# Patient Record
Sex: MALE | NOT HISPANIC OR LATINO | URBAN - METROPOLITAN AREA
[De-identification: names, ages, dates, MRNs, and addresses within clinical notes are randomized per-mention and may not be internally consistent; named-entity substitution may affect disease eponyms.]

---

## 2020-12-22 ENCOUNTER — IMPORTED ENCOUNTER (OUTPATIENT)
Dept: URBAN - METROPOLITAN AREA CLINIC 1 | Facility: CLINIC | Age: 75
End: 2020-12-22

## 2020-12-22 PROBLEM — H26.492: Noted: 2020-12-22

## 2020-12-22 PROBLEM — Z96.1: Noted: 2020-12-22

## 2020-12-22 PROBLEM — H25.811: Noted: 2020-12-22

## 2020-12-22 PROCEDURE — 92004 COMPRE OPH EXAM NEW PT 1/>: CPT

## 2020-12-22 PROCEDURE — 92015 DETERMINE REFRACTIVE STATE: CPT

## 2020-12-22 NOTE — PATIENT DISCUSSION
1.  Cataract OD: Observe for now without intervention. The patient was advised to contact us if any change or worsening of vision2. PCO  OS: (Posterior Capsule Opacification)   Observe and consider yag cap when pt feels pco visually significant and visual acuity decreases to appropriate level. 3. Iris Atrophy OS - observe. 4.  Pseudophakia OS - done by PMG a long time ago  MRX for glasses given. Return for an appointment in 6 months 10/GITA/norberto with Dr. Luiz Thomas.

## 2021-05-18 ENCOUNTER — IMPORTED ENCOUNTER (OUTPATIENT)
Dept: URBAN - METROPOLITAN AREA CLINIC 1 | Facility: CLINIC | Age: 76
End: 2021-05-18

## 2021-05-18 PROBLEM — H18.20: Noted: 2021-05-18

## 2021-05-18 PROBLEM — H20.9: Noted: 2021-05-18

## 2021-05-18 PROCEDURE — 99213 OFFICE O/P EST LOW 20 MIN: CPT

## 2021-05-18 NOTE — PATIENT DISCUSSION
D/c Erythromycin keri secondary to possible allergic reaction. Begin Tobradex QID OS (erx'd). Begin Refresh Marcos 3 PF OS (sample given)Return for an appointment in Thursday 10 with Dr. Ayde Cowan.

## 2021-05-18 NOTE — PATIENT DISCUSSION
1.  Uveitis OS - Possibly traumatic secondary to FB removal. D/c Erythromycin keri secondary to possible allergic reaction. Begin Tobradex QID OS (erx'd). Begin Refresh Marcos 3 PF OS (sample given)2. Corneal Edema OS3. Cataract OD- Observe for now without intervention. The patient was advised to contact us if any change or worsening of vision4. PCO  OS- (Posterior Capsule Opacification) Observe and consider yag cap when pt feels pco visually significant and visual acuity decreases to appropriate level. 5. Iris Atrophy OS- Observe. 6.  Pseudophakia OS- Done by PMG a long time agoReturn for an appointment in Thursday 10 with Dr. Joni Schaumann.

## 2021-05-20 ENCOUNTER — IMPORTED ENCOUNTER (OUTPATIENT)
Dept: URBAN - METROPOLITAN AREA CLINIC 1 | Facility: CLINIC | Age: 76
End: 2021-05-20

## 2021-05-20 PROBLEM — H18.20: Noted: 2021-05-20

## 2021-05-20 PROBLEM — H20.9: Noted: 2021-05-20

## 2021-05-20 PROCEDURE — 99213 OFFICE O/P EST LOW 20 MIN: CPT

## 2021-05-20 NOTE — PATIENT DISCUSSION
1.  Uveitis OS - Possibly traumatic secondary to FB removal. Improving. Decrease Tobradex to BID OS. Cont PF ATs QID-Q2Hrs OS. Begin Durezol QID OS (2x sample given). 2. Corneal Edema OS - Improving. 3.  Cataract OD: Observe for now without intervention. The patient was advised to contact us if any change or worsening of vision4. PCO  OS: (Posterior Capsule Opacification)   Observe and consider yag cap when pt feels pco visually significant and visual acuity decreases to appropriate level. 5. Iris Atrophy OS - observe. 6.  Pseudophakia OS - done by PMG a long time agoReturn for an appointment in Monday for a 10 with Dr. Jaleel Paez.

## 2021-05-24 ENCOUNTER — IMPORTED ENCOUNTER (OUTPATIENT)
Dept: URBAN - METROPOLITAN AREA CLINIC 1 | Facility: CLINIC | Age: 76
End: 2021-05-24

## 2021-05-24 PROCEDURE — 99213 OFFICE O/P EST LOW 20 MIN: CPT

## 2021-05-24 PROCEDURE — 92134 CPTRZ OPH DX IMG PST SGM RTA: CPT

## 2021-05-24 NOTE — PATIENT DISCUSSION
1.  Decrease VA secondary to Macular Edema OS- Begin Prolensa QD OS only (sample given). Increase Durezol to Q2 hours OS only. Mac OCT done today shows Mac Edema OS2. Uveitis OS- Possibly traumatic secondary to FB removal. Improving. Cont PF ATs QID-Q2Hrs OS. Continue Durezol increase to Q2 hours OS. 3. Cataract OD- Observe for now without intervention. The patient was advised to contact us if any change or worsening of vision4. PCO OS- (Posterior Capsule Opacification) Observe and consider yag cap when pt feels pco visually significant and visual acuity decreases to appropriate level. 5. Iris Atrophy OS- Observe. 6.  Pseudophakia OS- Done by PMG a long time agoReturn for an appointment in 1 week 10/Mac OCT with Dr. Bear.

## 2021-05-26 PROBLEM — H35.81: Noted: 2021-05-26

## 2021-05-26 PROBLEM — H20.9: Noted: 2021-05-24

## 2021-06-03 ENCOUNTER — IMPORTED ENCOUNTER (OUTPATIENT)
Dept: URBAN - METROPOLITAN AREA CLINIC 1 | Facility: CLINIC | Age: 76
End: 2021-06-03

## 2021-06-03 PROBLEM — H35.352: Noted: 2021-06-03

## 2021-06-03 PROBLEM — H20.9: Noted: 2021-06-03

## 2021-06-03 PROCEDURE — 92134 CPTRZ OPH DX IMG PST SGM RTA: CPT

## 2021-06-03 PROCEDURE — 92012 INTRM OPH EXAM EST PATIENT: CPT

## 2021-06-03 NOTE — PATIENT DISCUSSION
1.  Primary Anterior Uveitis w/ CME OS - Possibly traumatic secondary to FB removal. Uveitis is improving however CME remains. Will plan Kenalog injection OS (written rx given for Kenalog 40 mg/mL). Prolensa QD OS only. Durezol to Q2 hours OS only. Mac OCT done again today shows CME OS with mild worsening CME. Cont PF ATs QID-Q2Hrs OS. 2.  Cataract OD - Observe for now without intervention. The patient was advised to contact us if any change or worsening of vision3. PCO OS - (Posterior Capsule Opacification) Observe and consider YAG cap when pt feels PCO visually significant and visual acuity decreases to appropriate level. 4. Iris Atrophy OS - Observe. 5.  Pseudophakia OS - Done by PMG a long time ago6. S/p LASIK OU (monoVA)Return for an appointment in tomorrow Kenalog injection OS with Dr. Cristina Gray.

## 2021-06-04 ENCOUNTER — IMPORTED ENCOUNTER (OUTPATIENT)
Dept: URBAN - METROPOLITAN AREA CLINIC 1 | Facility: CLINIC | Age: 76
End: 2021-06-04

## 2021-06-04 PROCEDURE — 68200 TREAT EYELID BY INJECTION: CPT

## 2021-06-04 NOTE — PATIENT DISCUSSION
Pre op diagnosis: Uncontolled Uveitis OS w/ CME. Post op diagnosis: Uncontrolled Uveitis OS w/ CMEProcedure: Subconjunctival Kenalog injection OS:After proper written consent was obtained the patients left eye was prepped in standard surgical fashion. 0.3 cc of Kenalog 40mg/cc was then injection inferiorly in subconjunctival space without difficulty. Patient tolerated the procedure well and was instructed to follow up in one week.

## 2021-06-11 ENCOUNTER — IMPORTED ENCOUNTER (OUTPATIENT)
Dept: URBAN - METROPOLITAN AREA CLINIC 1 | Facility: CLINIC | Age: 76
End: 2021-06-11

## 2021-06-11 PROCEDURE — 99213 OFFICE O/P EST LOW 20 MIN: CPT

## 2021-06-11 NOTE — PATIENT DISCUSSION
1.  Primary Anterior Uveitis w/ CME OS - Appears improved however CME remains. S/p Kenalog injection OS Restart Prolensa QD OS only. Decrease Durezol to QID OS only. 2.  Cataract OD - Observe for now without intervention. The patient was advised to contact us if any change or worsening of vision3. PCO OS - (Posterior Capsule Opacification) Observe and consider YAG cap when pt feels PCO visually significant and visual acuity decreases to appropriate level. 4. Iris Atrophy OS - Observe. 5.  Pseudophakia OS - Done by PMG a long time ago6. S/p LASIK OU (monoVA)Return for an appointment in 3-4 weeks 10/MAC OCT/MRX with Dr. Dylon Walton.

## 2021-06-22 PROBLEM — H20.9: Noted: 2021-06-22

## 2022-04-02 ASSESSMENT — TONOMETRY
OS_IOP_MMHG: 17
OD_IOP_MMHG: 16
OS_IOP_MMHG: 16
OD_IOP_MMHG: 17
OS_IOP_MMHG: 16
OD_IOP_MMHG: 16
OS_IOP_MMHG: 16
OD_IOP_MMHG: 16
OD_IOP_MMHG: 16
OS_IOP_MMHG: 16
OD_IOP_MMHG: 16
OS_IOP_MMHG: 17

## 2022-04-02 ASSESSMENT — VISUAL ACUITY
OD_SC: 20/20
OD_CC: 20/25
OS_SC: 20/100
OD_CC: 20/25-1
OS_CC: 20/400
OD_SC: 20/20
OD_CC: 20/25
OS_SC: 20/25
OD_CC: 20/25
OD_CC: 20/25
OS_CC: 20/300
OS_CC: 20/100-1
OS_SC: 20/25
OS_SC: J1
OS_CC: 20/150
OS_CC: 20/150